# Patient Record
Sex: FEMALE | Race: WHITE
[De-identification: names, ages, dates, MRNs, and addresses within clinical notes are randomized per-mention and may not be internally consistent; named-entity substitution may affect disease eponyms.]

---

## 2021-06-03 ENCOUNTER — HOSPITAL ENCOUNTER (OUTPATIENT)
Dept: HOSPITAL 76 - DI.S | Age: 31
Discharge: HOME | End: 2021-06-03
Attending: PHYSICIAN ASSISTANT
Payer: SELF-PAY

## 2021-06-03 DIAGNOSIS — M21.612: ICD-10-CM

## 2021-06-03 DIAGNOSIS — M20.12: Primary | ICD-10-CM

## 2021-06-03 NOTE — XRAY REPORT
PROCEDURE:  Ankle 3 View LT

 

INDICATIONS:  LEFT FOOT/ANKLE PAIN

 

TECHNIQUE:  3 views of the ankle were acquired.  

 

COMPARISON:  None

 

FINDINGS:  

 

Bones:  No fractures or dislocations.  Ankle mortise is normally aligned.  No suspicious bony lesions
.  

 

Soft tissues:  No tibiotalar joint effusion.  Achilles tendon appears normal.  

 

IMPRESSION:  No evidence acute bony abnormality of the left ankle.

 

Reviewed by: Ankur Lopze MD on 6/3/2021 10:08 AM PDT

Approved by: Ankur Lopez MD on 6/3/2021 10:08 AM PDT

 

 

Station ID:  [id number]

## 2021-06-03 NOTE — XRAY REPORT
PROCEDURE:  Foot 3 View LT

 

INDICATIONS:  LEFT FOOT/ANKLE PAIN

 

TECHNIQUE:  3 views of the foot were acquired.  

 

COMPARISON:  None

 

FINDINGS:  

 

Bones:  Mild hallux valgus and bunion. No fractures or dislocations.  No suspicious bony lesions.  

 

Soft tissues:  No tibiotalar joint effusion.  Achilles tendon appears normal.  

 

IMPRESSION:  

Mild hallux valgus and bunion. No evidence of acute bony abnormality of the left foot.

 

Reviewed by: Ankur Lopez MD on 6/3/2021 10:09 AM PDT

Approved by: Ankur Lopez MD on 6/3/2021 10:09 AM PDT

 

 

Station ID:  [id number]

## 2022-05-13 ENCOUNTER — HOSPITAL ENCOUNTER (EMERGENCY)
Dept: HOSPITAL 76 - ED | Age: 32
Discharge: HOME | End: 2022-05-13
Payer: MEDICAID

## 2022-05-13 VITALS — DIASTOLIC BLOOD PRESSURE: 67 MMHG | SYSTOLIC BLOOD PRESSURE: 110 MMHG

## 2022-05-13 DIAGNOSIS — R11.2: ICD-10-CM

## 2022-05-13 DIAGNOSIS — O26.891: Primary | ICD-10-CM

## 2022-05-13 DIAGNOSIS — O99.281: ICD-10-CM

## 2022-05-13 DIAGNOSIS — E86.0: ICD-10-CM

## 2022-05-13 DIAGNOSIS — Z3A.12: ICD-10-CM

## 2022-05-13 DIAGNOSIS — R19.7: ICD-10-CM

## 2022-05-13 LAB
ALBUMIN DIAFP-MCNC: 3.9 G/DL (ref 3.2–5.5)
ALBUMIN/GLOB SERPL: 1.1 {RATIO} (ref 1–2.2)
ALP SERPL-CCNC: 40 IU/L (ref 42–121)
ALT SERPL W P-5'-P-CCNC: 27 IU/L (ref 10–60)
ANION GAP SERPL CALCULATED.4IONS-SCNC: 11 MMOL/L (ref 6–13)
AST SERPL W P-5'-P-CCNC: 33 IU/L (ref 10–42)
BASOPHILS NFR BLD AUTO: 0 10^3/UL (ref 0–0.1)
BASOPHILS NFR BLD AUTO: 0.8 %
BILIRUB BLD-MCNC: 0.2 MG/DL (ref 0.2–1)
BUN SERPL-MCNC: 6 MG/DL (ref 6–20)
CALCIUM UR-MCNC: 8.8 MG/DL (ref 8.5–10.3)
CHLORIDE SERPL-SCNC: 103 MMOL/L (ref 101–111)
CLARITY UR REFRACT.AUTO: CLEAR
CO2 SERPL-SCNC: 22 MMOL/L (ref 21–32)
CREAT SERPLBLD-SCNC: 0.6 MG/DL (ref 0.4–1)
EOSINOPHIL # BLD AUTO: 0 10^3/UL (ref 0–0.7)
EOSINOPHIL NFR BLD AUTO: 0.8 %
ERYTHROCYTE [DISTWIDTH] IN BLOOD BY AUTOMATED COUNT: 11.4 % (ref 12–15)
GFRSERPLBLD MDRD-ARVRAT: 118 ML/MIN/{1.73_M2} (ref 89–?)
GLOBULIN SER-MCNC: 3.4 G/DL (ref 2.1–4.2)
GLUCOSE SERPL-MCNC: 100 MG/DL (ref 70–100)
GLUCOSE UR QL STRIP.AUTO: NEGATIVE MG/DL
HCG UR QL: POSITIVE
HCT VFR BLD AUTO: 44.9 % (ref 37–47)
HGB UR QL STRIP: 15.3 G/DL (ref 12–16)
KETONES UR QL STRIP.AUTO: 15 MG/DL
LIPASE SERPL-CCNC: 32 U/L (ref 22–51)
LYMPHOCYTES # SPEC AUTO: 0.6 10^3/UL (ref 1.5–3.5)
LYMPHOCYTES NFR BLD AUTO: 15.5 %
MCH RBC QN AUTO: 31.9 PG (ref 27–31)
MCHC RBC AUTO-ENTMCNC: 34.1 G/DL (ref 32–36)
MCV RBC AUTO: 93.5 FL (ref 81–99)
MONOCYTES # BLD AUTO: 0.3 10^3/UL (ref 0–1)
MONOCYTES NFR BLD AUTO: 8.1 %
NEUTROPHILS # BLD AUTO: 2.9 10^3/UL (ref 1.5–6.6)
NEUTROPHILS # SNV AUTO: 3.9 X10^3/UL (ref 4.8–10.8)
NEUTROPHILS NFR BLD AUTO: 74.5 %
NITRITE UR QL STRIP.AUTO: NEGATIVE
NRBC # BLD AUTO: 0 /100WBC
NRBC # BLD AUTO: 0 X10^3/UL
PDW BLD AUTO: 10.2 FL (ref 7.9–10.8)
PH UR STRIP.AUTO: 6 PH (ref 5–7.5)
PLATELET # BLD: 277 10^3/UL (ref 130–450)
POTASSIUM SERPL-SCNC: 3.1 MMOL/L (ref 3.5–5)
PROT SPEC-MCNC: 7.3 G/DL (ref 6.7–8.2)
PROT UR STRIP.AUTO-MCNC: NEGATIVE MG/DL
RBC # UR STRIP.AUTO: NEGATIVE /UL
RBC MAR: 4.8 10^6/UL (ref 4.2–5.4)
SODIUM SERPLBLD-SCNC: 136 MMOL/L (ref 135–145)
SP GR UR STRIP.AUTO: >=1.03 (ref 1–1.03)
UROBILINOGEN UR QL STRIP.AUTO: (no result) E.U./DL
UROBILINOGEN UR STRIP.AUTO-MCNC: NEGATIVE MG/DL

## 2022-05-13 PROCEDURE — 80053 COMPREHEN METABOLIC PANEL: CPT

## 2022-05-13 PROCEDURE — 87086 URINE CULTURE/COLONY COUNT: CPT

## 2022-05-13 PROCEDURE — 81025 URINE PREGNANCY TEST: CPT

## 2022-05-13 PROCEDURE — 96361 HYDRATE IV INFUSION ADD-ON: CPT

## 2022-05-13 PROCEDURE — 96374 THER/PROPH/DIAG INJ IV PUSH: CPT

## 2022-05-13 PROCEDURE — 99283 EMERGENCY DEPT VISIT LOW MDM: CPT

## 2022-05-13 PROCEDURE — 81003 URINALYSIS AUTO W/O SCOPE: CPT

## 2022-05-13 PROCEDURE — 85025 COMPLETE CBC W/AUTO DIFF WBC: CPT

## 2022-05-13 PROCEDURE — 36415 COLL VENOUS BLD VENIPUNCTURE: CPT

## 2022-05-13 PROCEDURE — 81001 URINALYSIS AUTO W/SCOPE: CPT

## 2022-05-13 PROCEDURE — 83690 ASSAY OF LIPASE: CPT

## 2022-05-13 PROCEDURE — 99282 EMERGENCY DEPT VISIT SF MDM: CPT

## 2022-05-13 NOTE — ED PHYSICIAN DOCUMENTATION
PD HPI NVD





- Stated complaint


Stated Complaint: VOMITING,DIARRHEA +PREG





- Chief complaint


Chief Complaint: Abd Pain





- History obtained from


History obtained from: Patient





- History of Present Illness


Timing - onset: Today (Onset of last evening of nausea and vomiting with 

subsequent copious watery diarrhea.  Denies blood in her vomit nor stool.  

Feeling weak and lightheaded today.  She is 12 weeks pregnant.  Denies vaginal 

bleeding.  Mild lower abdominal cramping.), Last night


Timing - duration: Days (1)


Timing - details: Abrupt onset, Still present


Associated symptoms: Abdominal pain (mild upper abd cramping.), Loss of 

appetite.  No: Fever, Near syncope / syncope (but feeling lightheaded.)


Contributing factors: Sick contact (Her daughter had some nausea and vomiting 

mildly 4 to 5 days ago and is better after day.), Other (She did home COVID 

rapid antigen test that was negative.).  No: Bad food


Improved by: No: Vomiting


Worsened by: Eating


Similar symptoms before: Has not had sx before


Recently seen: Not recently seen





Review of Systems


Constitutional: reports: Myalgias.  denies: Fever, Chills


Nose: denies: Rhinorrhea / runny nose, Congestion


Throat: denies: Sore throat


Respiratory: denies: Cough


: denies: Dysuria, Discharge, Vaginal bleeding





PD PAST MEDICAL HISTORY





- Past Medical History


Cardiovascular: None


Respiratory: None


GYN: Other (12 weeks pregnant)





- Past Surgical History


Past Surgical History: No





- Present Medications


Home Medications: 


                                Ambulatory Orders











 Medication  Instructions  Recorded  Confirmed


 


Loperamide HCl [Imodium A-D] 2 mg PO QID PRN #12 tablet 05/13/22 


 


Ondansetron Odt [Zofran] 4 mg TL Q6H PRN #15 tablet 05/13/22 














- Allergies


Allergies/Adverse Reactions: 


                                    Allergies











Allergy/AdvReac Type Severity Reaction Status Date / Time


 


Penicillins Allergy Intermediate Edema Verified 05/13/22 14:10














- Social History


Does the pt smoke?: No


Smoking Status: Never smoker


Does the pt drink ETOH?: Yes


Does the pt have substance abuse?: No





- Immunizations


Immunizations are current?: No





- POLST


Patient has POLST: No





PD ED PE NORMAL





- Vitals


Vital signs reviewed: Yes





- General


General: Alert and oriented X 3, Well developed/nourished





- HEENT


HEENT: Pharynx benign.  No: Moist mucous membranes





- Neck


Neck: Supple, no meningeal sign, No adenopathy





- Cardiac


Cardiac: RRR, No murmur





- Respiratory


Respiratory: Clear bilaterally





- Abdomen


Abdomen: Normal bowel sounds, Soft, Non distended, No organomegaly, Other (Mild 

periumbilical to upper mid abdomen tenderness without any percussion or rebound 

tenderness.)





Results





- Vitals


Vitals: 


                               Vital Signs - 24 hr











  05/13/22 05/13/22 05/13/22





  14:10 16:12 18:00


 


Temperature 37.1 C  


 


Heart Rate 107 H 84 99


 


Respiratory 19 19 18





Rate   


 


Blood Pressure 123/67 116/76 110/67


 


O2 Saturation 97 100 100








                                     Oxygen











O2 Source                      Room air

















- Labs


Labs: 


                                Laboratory Tests











  05/13/22 05/13/22 05/13/22





  14:18 14:18 14:26


 


WBC  3.9 L  


 


RBC  4.80  


 


Hgb  15.3  


 


Hct  44.9  


 


MCV  93.5  


 


MCH  31.9 H  


 


MCHC  34.1  


 


RDW  11.4 L  


 


Plt Count  277  


 


MPV  10.2  


 


Neut # (Auto)  2.9  


 


Lymph # (Auto)  0.6 L  


 


Mono # (Auto)  0.3  


 


Eos # (Auto)  0.0  


 


Baso # (Auto)  0.0  


 


Absolute Nucleated RBC  0.00  


 


Nucleated RBC %  0.0  


 


Sodium   136 


 


Potassium   3.1 L 


 


Chloride   103 


 


Carbon Dioxide   22 


 


Anion Gap   11.0 


 


BUN   6 


 


Creatinine   0.6 


 


Estimated GFR (MDRD)   118 


 


Glucose   100 


 


Calcium   8.8 


 


Total Bilirubin   0.2 


 


AST   33 


 


ALT   27 


 


Alkaline Phosphatase   40 L 


 


Total Protein   7.3 


 


Albumin   3.9 


 


Globulin   3.4 


 


Albumin/Globulin Ratio   1.1 


 


Lipase   32 


 


Urine Color    YELLOW


 


Urine Clarity    CLEAR


 


Urine pH    6.0


 


Ur Specific Gravity    >=1.030 H


 


Urine Protein    NEGATIVE


 


Urine Glucose (UA)    NEGATIVE


 


Urine Ketones    15 H


 


Urine Occult Blood    NEGATIVE


 


Urine Nitrite    NEGATIVE


 


Urine Bilirubin    NEGATIVE


 


Urine Urobilinogen    0.2 (NORMAL)


 


Ur Leukocyte Esterase    NEGATIVE


 


Ur Microscopic Review    NOT INDICATED


 


Urine Culture Comments    NOT INDICATED


 


Urine HCG, Qual    POSITIVE














PD MEDICAL DECISION MAKING





- ED course


Complexity details: reviewed results (Bedside ultrasound showed normal pregnancy

 with fetal heart motion and fetal movement.  Amniotic fluid volume appeared 

appropriate.  Brief look at gallbladder appeared normal as well without any 

stones or wall thickening.), re-evaluated patient (feeling improved and able to 

take fluids/crackers. She does not hae nausea now. No cramps/bleeding. ), 

considered differential, d/w patient





Departure





- Departure


Disposition: 01 Home, Self Care


Clinical Impression: 


 Nausea vomiting and diarrhea, Dehydration





Pregnancy


Qualifiers:


 Weeks of gestation: 12 weeks Qualified Code(s): Z3A.12 - 12 weeks gestation of 

pregnancy





Condition: Stable


Record reviewed to determine appropriate education?: Yes


Instructions:  ED Gastroenteritis Viral


Follow-Up: 


Kristin Padilla MD [Primary Care Provider] - 


Prescriptions: 


Loperamide HCl [Imodium A-D] 2 mg PO QID PRN #12 tablet


 PRN Reason: Diarrhea


Ondansetron Odt [Zofran] 4 mg TL Q6H PRN #15 tablet


 PRN Reason: Nausea / Vomiting


Comments: 


Small frequent fluids.  [place] food initially.  Progress as tolerated.





I presume this is a viral gastroenteritis and would last just a couple of days. 

 You have received IV fluids here to try to rehydrate you.





We can continue with the ondansetron every 4-6 hours if needed for nausea.  For 

a limited time can also use Imodium 4 times a day if needed for the diarrhea.





Add Tylenol every 4-6 hours if needed for cramps or pains.





Your bedside ultrasound showed a normal-appearing fetus with good to heart 

activity and movement.  Recheck if any unusual pains vaginal bleeding or other 

concerns.





Recheck if not improved well over the next day or 2.  Return if worse.





I transmitted your prescriptions to [facility] in [place].


Discharge Date/Time: 05/13/22 18:38

## 2022-09-30 ENCOUNTER — HOSPITAL ENCOUNTER (EMERGENCY)
Dept: HOSPITAL 76 - ED | Age: 32
Discharge: HOME | End: 2022-09-30
Payer: MEDICAID

## 2022-09-30 VITALS — SYSTOLIC BLOOD PRESSURE: 109 MMHG | DIASTOLIC BLOOD PRESSURE: 62 MMHG

## 2022-09-30 DIAGNOSIS — Z3A.32: ICD-10-CM

## 2022-09-30 DIAGNOSIS — O99.513: Primary | ICD-10-CM

## 2022-09-30 DIAGNOSIS — J20.9: ICD-10-CM

## 2022-09-30 PROCEDURE — 93005 ELECTROCARDIOGRAM TRACING: CPT

## 2022-09-30 PROCEDURE — 99283 EMERGENCY DEPT VISIT LOW MDM: CPT

## 2022-09-30 NOTE — EXTERNAL MEDICAL SUMMARY RPT
Continuity of Care Document

                          Created on:2022



Patient:Moris Valentine

Sex:Female

:1990

External Reference #:[id number]





Demographics







                          Address                   [address] [place] [address]

 

                          Phone                     Unavailable

 

                          Preferred Language        English

 

                          Marital Status            

 

                          Pentecostal Affiliation     Unknown

 

                          Race                      Unknown

 

                          Ethnic Group              Unknown









Author







                          Organization              [facility]

 

                          [place] [address]

 

                          Phone                     [phone]









Allergies

No information.



Encounters

No information.



Functional Status

No information.



Immunizations

No information.



Medications

No information.



Problems

No information.



Procedures

No information.



Results/Labs







           test      date      author    facility  value     unit      interpret

ation









                                         Result panel 1









           (unknown)  (no       (unknown)  (unknown)  (no value)  (units    (unk

nown)



                    date)                                   unknown)  

 

           (unknown)  (no       (unknown)  (unknown)  [address] [place] [address]  (units  

  (unknown)



                    date)                                   unknown)  

 

           (unknown)  (no       (unknown)  (unknown)  [place]  (units    (

unknown)



                    date)                         [id number]     unknown)  

 

           (unknown)  (no       (unknown)  (unknown)  [facility]  (units   

 (unknown)



                    date)                                   unknown)  

 

           (unknown)  (no       (unknown)  (unknown)  Signed    (units    (unkno

wn)



                    date)                                   unknown)  

 

           (unknown)  (no       (unknown)  (unknown)  Ultrasound  (units    (unk

nown)



                    date)                         Report    unknown)  

 

           (unknown)  (no       (unknown)  (unknown)  (no value)  (units    (unk

nown)



                    date)                                   unknown)  

 

           (unknown)  (no       (unknown)  (unknown)  22  (units    (unkno

wn)



                    date)                                   unknown)  

 

           (unknown)  (no       (unknown)  (unknown)  7 mm in 3rd  (units    (un

known)



                    date)                         trimester. unknown)  

 

           (unknown)  (no       (unknown)  (unknown)  Abdominal  (units    (unkn

own)



                    date)                         circumference: unknown)  



                                                  15.9 cm             

 

           (unknown)  (no       (unknown)  (unknown)  Amniotic fluid  (units    

(unknown)



                    date)                         index: 14.9 cm, unknown)  



                                                  normal range is           



                                                  5-24 cm.            

 

           (unknown)  (no       (unknown)  (unknown)  Anatomic survey:  (units  

  (unknown)



                    date)                         396g      unknown)  

 

           (unknown)  (no       (unknown)  (unknown)  Approved by:  (units    (u

nknown)



                    date)                         El Mcclendon M.D. unknown)  



                                                  on 2022 at           



                                                  15:06               

 

           (unknown)  (no       (unknown)  (unknown)  Biparietal  (units    (unk

nown)



                    date)                         diameter: 4.6cm unknown)  

 

           (unknown)  (no       (unknown)  (unknown)  Bladder: Normal  (units   

 (unknown)



                    date)                         in size.  unknown)  

 

           (unknown)  (no       (unknown)  (unknown)  COMPARISON:  (units    (un

known)



                    date)                         None.     unknown)  

 

           (unknown)  (no       (unknown)  (unknown)  Cord: 3-vessel  (units    

(unknown)



                    date)                         cord has  unknown)  



                                                  orthotopic           



                                                  insertion.           

 

           (unknown)  (no       (unknown)  (unknown)  Diaphragm:  (units    (unk

nown)



                    date)                         Diaphragm is unknown)  



                                                  intact.             

 

           (unknown)  (no       (unknown)  (unknown)  Dictated by:  (units    (u

nown)



                    date)                         El Mcclendon M.D. unknown)  



                                                  on 2022 at           



                                                  14:58               

 

           (unknown)  (no       (unknown)  (unknown)  Endovaginal  (units    (un

known)



                    date)                         scanning: not unknown)  



                                                  performed           

 

           (unknown)  (no       (unknown)  (unknown)  Estimated date  (units    

(unknown)



                    date)                         of delivery ([place]) unknown)  



                                                  from first dating           



                                                  scan (this           



                                                  study): 2022           

 

           (unknown)  (no       (unknown)  (unknown)  Estimated fetal  (units   

 (unknown)



                    date)                         weight and unknown)  



                                                  percentile: 396g           

 

           (unknown)  (no       (unknown)  (unknown)  Extremities: All  (units  

  (unknown)



                    date)                         4 extremities unknown)  



                                                  identified.           

 

           (unknown)  (no       (unknown)  (unknown)  FINDINGS:  (units    (unkn

own)



                    date)                                   unknown)  

 

           (unknown)  (no       (unknown)  (unknown)  Face: Nose and  (units    

(unknown)



                    date)                         lips, facial unknown)  



                                                  profile are           



                                                  normal.             

 

           (unknown)  (no       (unknown)  (unknown)  Femur length:  (units    (

unknown)



                    date)                         3.0 cm    unknown)  

 

           (unknown)  (no       (unknown)  (unknown)  Fetal     (units    (unkno

wn)



                    date)                         biometrics: unknown)  

 

           (unknown)  (no       (unknown)  (unknown)  Fetal heart  (units    (un

known)



                    date)                         rate: 162 beats unknown)  



                                                  per minute.           

 

           (unknown)  (no       (unknown)  (unknown)  General: A  (units    (unk

nown)



                    date)                         single living unknown)  



                                                  intrauterine           



                                                  gestation is           



                                                  present.            

 

           (unknown)  (no       (unknown)  (unknown)  Head      (units    (unkno

wn)



                    date)                         circumference: unknown)  



                                                  13.1 cm             

 

           (unknown)  (no       (unknown)  (unknown)  Heart:    (units    (unkno

wn)



                    date)                         4-chambered heart unknown)  



                                                  is present, with           



                                                  normal              



                                                  ventricular           



                                                  outflow tracts.           

 

           (unknown)  (no       (unknown)  (unknown)  IMPRESSION:  (units    (un

known)



                    date)                         Intrauterine unknown)  



                                                  living gestation           



                                                  identified at 20           



                                                  weeks and 5 days           

 

           (unknown)  (no       (unknown)  (unknown)  INDICATIONS:  (units    (u

nknown)



                    date)                         ANATOMY SCAN unknown)  

 

           (unknown)  (no       (unknown)  (unknown)  Kidneys: No  (units    (un

known)



                    date)                         fetal     unknown)  



                                                  hydronephrosis.           



                                                  Normal is less           



                                                  than 5 mm in 2nd           



                                                  trimester,           

 

           (unknown)  (no       (unknown)  (unknown)  LMP unknown  (units    (un

known)



                    date)                                   unknown)  

 

           (unknown)  (no       (unknown)  (unknown)  Maternal  (units    (unkno

wn)



                    date)                         cervical canal: unknown)  



                                                  4.9cm long.           



                                                  Normal lower           



                                                  limit is 2.5 cm.           

 

           (unknown)  (no       (unknown)  (unknown)  Neuro:    (units    (unkno

wn)



                    date)                         Ventricles are unknown)  



                                                  non-dilated at           



                                                  less than 10 mm.           



                                                  Cisterna magna is           

 

           (unknown)  (no       (unknown)  (unknown)  Nuchal skin  (units    (un

known)



                    date)                         fold:  Normal at unknown)  



                                                  less than 6 mm           



                                                  between 14-21           



                                                  weeks gestational           

 

           (unknown)  (no       (unknown)  (unknown)  OUTSIDE/PRIOR  (units    (

unknown)



                    date)                         DATING DATA: unknown)  

 

           (unknown)  (no       (unknown)  (unknown)  Placenta:  (units    (unkn

own)



                    date)                         Placental unknown)  



                                                  position is           



                                                  posterior ,           



                                                  without previa           

 

           (unknown)  (no       (unknown)  (unknown)  Presentation:  (units    (

unknown)



                    date)                         vertex    unknown)  

 

           (unknown)  (no       (unknown)  (unknown)  Real-time  (units    (unkn

own)



                    date)                         scanning was unknown)  



                                                  performed of the           



                                                  fetus, with image           



                                                  documentation and           

 

           (unknown)  (no       (unknown)  (unknown)  Spine: No  (units    (unkn

own)



                    date)                         evidence for unknown)  



                                                  spina bifida.           

 

           (unknown)  (no       (unknown)  (unknown)  Stomach:  (units    (unkno

wn)



                    date)                         Left-sided unknown)  



                                                  stomach is           



                                                  present.            

 

           (unknown)  (no       (unknown)  (unknown)  TECHNIQUE:  (units    (unk

nown)



                    date)                                   unknown)  

 

           (unknown)  (no       (unknown)  (unknown)  estimated  (units    (unkn

own)



                    date)                         gestational age: unknown)  



                                                  20 weeks and 5           



                                                  days.               

 

           (unknown)  (no       (unknown)  (unknown)  measurements.  (units    (

unknown)



                    date)                                   unknown)  

 

           (unknown)  (no       (unknown)  (unknown)  mm. Cerebellum  (units    

(unknown)



                    date)                         is normal in size unknown)  



                                                  and morphology.           

 

           (unknown)  (no       (unknown)  (unknown)  this ultrasound  (units   

 (unknown)



                    date)                         biometry. Routine unknown)  



                                                  fetal anatomic           



                                                  survey is normal.           

 

           (unknown)  (no       (unknown)  (unknown)  [id number]    (units    (unkno

wn)



                    date)                                   unknown)  

 

           (unknown)  (no       (unknown)  (unknown)  Accession  (units    (unkn

own)



                    date)                         Number:   unknown)  



                                                  [id number]           

 

           (unknown)  (no       (unknown)  (unknown)  Age/Sex: 32 / F  (units   

 (unknown)



                    date)                         Date of Service: unknown)  

 

           (unknown)  (no       (unknown)  (unknown)  : 1990  (units   

 (unknown)



                    date)                         Acct:[id number] unknown)  

 

           (unknown)  (no       (unknown)  (unknown)  Loc: [place]   (units    (unkno

wn)



                    date)                                   unknown)  

 

           (unknown)  (no       (unknown)  (unknown)  Ordering  (units    (unkno

wn)



                    date)                         Provider: unknown)  



                                                  Kanwal Shah           

 

           (unknown)  (no       (unknown)  (unknown)  PROCEDURE: US OB  (units  

  (unknown)



                    date)                         >= 14 WEEKS FETUS unknown)  

 

           (unknown)  (no       (unknown)  (unknown)  Patient:  (units    (unkno

wn)



                    date)                         Moris Valentine unknown)  



                                                  MR#: [id number]           

 

           (unknown)  (no       (unknown)  (unknown)  Procedure: US OB  (units  

  (unknown)



                    date)                         >= 14 weeks Fetus unknown)  

 

           (unknown)  (no       (unknown)  (unknown)  age.      (units    (unkno

wn)



                    date)                                   unknown)  

 

           (unknown)  (no       (unknown)  (unknown)  based on  (units    (unkno

wn)



                    date)                                   unknown)  

 

           (unknown)  (no       (unknown)  (unknown)  biometric  (units    (unkn

own)



                    date)                                   unknown)  

 

           (unknown)  (no       (unknown)  (unknown)  less than  (units    (unkn

own)



                    date)                                   unknown)  

 

           (unknown)  (no       (unknown)  (unknown)  normal at 3-11  (units    

(unknown)



                    date)                                   unknown)  







Social History

No information.



Vital Signs

No information.

## 2022-09-30 NOTE — ED PHYSICIAN DOCUMENTATION
PD HPI DYSPNEA





- Stated complaint


Stated Complaint: SOA/32WKS





- Chief complaint


Chief Complaint: Resp





- History obtained from


History obtained from: Patient





- History of Present Illness


Timing - onset: How many days ago (3)


Timing - details: Gradual onset


Pain level now: 0


Associated symptoms: No: Fever (Tmax 99.8)


Recently seen: Not recently seen





- Additional information


Additional information: 





chief complaint of dyspnea. She says she has a "head cold" (per patient) with 

sore throat, generalized myalgias and malaise. Sinus congestion although this 

has been improving. Symptom onset 3 days ago. Patient is 32 weeks pregnant, 

. Tmax 99.8. she has taken multiple COVID tests at home , results have been 

negative. She has has seasonal allergies for which she uses albuterol MDI, but 

only occasionally for a few weeks /months of the year. 





Review of Systems


Constitutional: denies: Fever (Tmax 99.8), Chills, Sweats


Throat: reports: Sore throat


Cardiac: denies: Chest pain / pressure


Respiratory: reports: Dyspnea, Cough, Wheezing.  denies: Hemoptysis


GI: reports: Reviewed and negative


: reports: Now pregnant EGABDIAS (32 weeks).  denies: Dysuria, Frequency





PD PAST MEDICAL HISTORY





- Past Medical History


Past Medical History: Yes


Cardiovascular: None


Respiratory: Asthma, Pneumonia, Other


GYN: Other


Other Past Medical History: bronchitis





- Past Surgical History


Past Surgical History: No





- Present Medications


Home Medications: 


                                Ambulatory Orders











 Medication  Instructions  Recorded  Confirmed


 


Loperamide HCl [Imodium A-D] 2 mg PO QID PRN #12 tablet 22 


 


Ondansetron Odt [Zofran] 4 mg TL Q6H PRN #15 tablet 22 


 


Albuterol Sulf [Ventolin Hfa 1 - 2 puffs INH Q4HR PRN #1 each 22 





Inhaler]   


 


Azithromycin See Taper PO DAILY #6 tablet 22 


 


predniSONE [Deltasone] 40 mg PO DAILY 4 Days #8 tablet 22 














- Allergies


Allergies/Adverse Reactions: 


                                    Allergies











Allergy/AdvReac Type Severity Reaction Status Date / Time


 


Penicillins Allergy Intermediate Edema Verified 22 18:31














- Social History


Does the pt smoke?: No


Smoking Status: Never smoker


Does the pt drink ETOH?: Yes


Does the pt have substance abuse?: No





- Immunizations


Immunizations are current?: No





- POLST


Patient has POLST: No





PD ED PE NORMAL





- Vitals


Vital signs reviewed: Yes





- General


General: Alert and oriented X 3, No acute distress, Well developed/nourished





- HEENT


HEENT: Pharynx benign





- Neck


Neck: Supple, no meningeal sign





- Cardiac


Cardiac: RRR, No murmur





- Respiratory


Respiratory: No respiratory distress





- Abdomen


Abdomen: Soft, Non tender





PD ED PE EXPANDED





- Respiratory


Respiratory: Wheezing (expiratory wheezing all lung fields)





Results





- Vitals


Vitals: 


                                     Oxygen











O2 Source                      Room air

















- EKG (time done)


  ** No standard instances


Rate: Rate (enter#) (99)


Rhythm: NSR


Axis: Normal


Intervals: Normal WV


QRS: Normal


Ischemia: Normal ST segments, T wave inversion (II, III, aVF), Other (flat T V3-

V6)





PD MEDICAL DECISION MAKING





- ED course


Complexity details: re-evaluated patient, considered differential, d/w patient


ED course: 





given duoneb and reports feeling much improved with this. On reexam she has 

residual end-expiratory wheezing. Prescriptions submitted to her pharmacy of 

choice for albuterol MDI as well as prednisone and zithromax. We discussed the 

latter two medications, including option for starting these in ED but patient 

prefers to use the prednisone only if she is not responding adequately to the 

albuterol MDI. She is encouraged to return if worse, but should she develop 

increasing productive cough or develop fever 100.4 or higher, she has the option

 of starting the azithromycin





Departure





- Departure


Disposition: 01 Home, Self Care


Clinical Impression: 


 Bronchitis with bronchospasm





Condition: Good


Instructions:  ED Bronchitis Asthmatic


Follow-Up: 


Kristin Padilla MD [Primary Care Provider] - 


Prescriptions: 


Albuterol Sulf [Ventolin Hfa Inhaler] 1 - 2 puffs INH Q4HR PRN #1 each


 PRN Reason: Shortness Of Air/Wheezing


Azithromycin See Taper PO DAILY #6 tablet


predniSONE [Deltasone] 40 mg PO DAILY 4 Days #8 tablet


Comments: 


Prescriptions have been electronically submitted to [facility] pharmacy in [place]. 


Albuterol inhaler to be used every four hours as needed for difficulty 

breathing.


I also prescribed an antibiotic. As we discussed, you can start the antibiotic 

if your symptoms worsen and/or if you develop a fever of 100.4 or higher. 


I also prescribed a steroid (prednisone), which you can start and take as 

prescribed if your symptoms worsen and not responding to the inhaler alone. 


Discharge Date/Time: 22 22:49

## 2022-12-01 ENCOUNTER — HOSPITAL ENCOUNTER (INPATIENT)
Dept: HOSPITAL 76 - WFO | Age: 32
LOS: 1 days | Discharge: HOME | End: 2022-12-02
Attending: ADVANCED PRACTICE MIDWIFE | Admitting: ADVANCED PRACTICE MIDWIFE
Payer: MEDICAID

## 2022-12-01 DIAGNOSIS — Z3A.39: ICD-10-CM

## 2022-12-01 LAB
BASOPHILS NFR BLD AUTO: 0 10^3/UL (ref 0–0.1)
BASOPHILS NFR BLD AUTO: 0.2 %
EOSINOPHIL # BLD AUTO: 0.2 10^3/UL (ref 0–0.7)
EOSINOPHIL NFR BLD AUTO: 1.5 %
ERYTHROCYTE [DISTWIDTH] IN BLOOD BY AUTOMATED COUNT: 12.5 % (ref 12–15)
HCT VFR BLD AUTO: 34.7 % (ref 37–47)
HGB UR QL STRIP: 11.4 G/DL (ref 12–16)
LYMPHOCYTES # SPEC AUTO: 1.8 10^3/UL (ref 1.5–3.5)
LYMPHOCYTES NFR BLD AUTO: 17.4 %
MCH RBC QN AUTO: 29.3 PG (ref 27–31)
MCHC RBC AUTO-ENTMCNC: 32.9 G/DL (ref 32–36)
MCV RBC AUTO: 89.2 FL (ref 81–99)
MONOCYTES # BLD AUTO: 0.5 10^3/UL (ref 0–1)
MONOCYTES NFR BLD AUTO: 5.1 %
NEUTROPHILS # BLD AUTO: 7.6 10^3/UL (ref 1.5–6.6)
NEUTROPHILS # SNV AUTO: 10.1 X10^3/UL (ref 4.8–10.8)
NEUTROPHILS NFR BLD AUTO: 75.2 %
NRBC # BLD AUTO: 0 /100WBC
NRBC # BLD AUTO: 0 X10^3/UL
PDW BLD AUTO: 12.5 FL (ref 7.9–10.8)
PLATELET # BLD: 247 10^3/UL (ref 130–450)
RBC MAR: 3.89 10^6/UL (ref 4.2–5.4)

## 2022-12-01 PROCEDURE — 86901 BLOOD TYPING SEROLOGIC RH(D): CPT

## 2022-12-01 PROCEDURE — 59025 FETAL NON-STRESS TEST: CPT

## 2022-12-01 PROCEDURE — 86900 BLOOD TYPING SEROLOGIC ABO: CPT

## 2022-12-01 PROCEDURE — 99215 OFFICE O/P EST HI 40 MIN: CPT

## 2022-12-01 PROCEDURE — 85025 COMPLETE CBC W/AUTO DIFF WBC: CPT

## 2022-12-01 PROCEDURE — 10907ZC DRAINAGE OF AMNIOTIC FLUID, THERAPEUTIC FROM PRODUCTS OF CONCEPTION, VIA NATURAL OR ARTIFICIAL OPENING: ICD-10-PCS | Performed by: ADVANCED PRACTICE MIDWIFE

## 2022-12-01 PROCEDURE — 86850 RBC ANTIBODY SCREEN: CPT

## 2022-12-01 RX ADMIN — ACETAMINOPHEN SCH MG: 500 TABLET ORAL at 14:31

## 2022-12-01 RX ADMIN — ACETAMINOPHEN SCH MG: 500 TABLET ORAL at 22:50

## 2022-12-01 RX ADMIN — IBUPROFEN SCH MG: 800 TABLET, FILM COATED ORAL at 14:31

## 2022-12-01 RX ADMIN — IBUPROFEN SCH MG: 800 TABLET, FILM COATED ORAL at 22:50

## 2022-12-01 RX ADMIN — DOCUSATE SODIUM SCH MG: 100 CAPSULE, LIQUID FILLED ORAL at 22:50

## 2022-12-01 NOTE — PROCEDURE REPORT
- HPI


                                                               Current Pregnancy





EDU                              22


Gestation                        39 Weeks and 6 Days


                          4


Para                             1





Vital Signs











Temperature  36.7 C   22 07:14


 


Heart Rate  78   22 07:14


 


Respiratory Rate  17   22 07:14


 


Blood Pressure  125/79   22 07:14


 


O2 Saturation  100   22 07:14




















Temperature  36.7 C   22 07:53


 


Heart Rate  78   22 07:14


 


Respiratory Rate  17   22 07:14


 


Blood Pressure  125/79   22 07:14


 


O2 Saturation  100   22 07:14


 


If not protocol: Oxygen Flow, liters/minute      














- NST Procedure


NST Procedure





Start Date                       22


Start Time                       07:10


Stop Time                        07:30


Vibroacoustic Stimulation Used   No


Patient States Fetal Movement    Yes











- Results and Plan


Plan: 





NST reactive. FHR baseline 150s, moderate variability, + accels, no decels


Contractions palpate moderate inconsistently with soft resting tone.

## 2022-12-01 NOTE — ANESTHESIA
Pre-Anesthesia VS, & Labs





- Diagnosis





request for labor analgesia





- Procedure





labor epidural


Vital Signs: 





                                        











Temp Pulse Resp BP Pulse Ox O2 Flow Rate


 


 36.7 C   78   17   125/79   100    


 


 22 07:53  22 07:14  22 07:14  22 07:14  22 07:14 

 











Height: 5 ft 5 in


Weight (kg): 75.57 kg


Body Mass Index: 27.7


BMI Classification: Overweight





- NPO


Other





- Pregnancy


Is Patient Pregnant?: Yes





- Lab Results


Current Lab Results: 





Laboratory Tests





22 08:06: Blood Type A POSITIVE


22 07:45: Blood Type Recheck A POSITIVE


22 07:45: WBC 10.1, RBC 3.89 L, Hgb 11.4 L, Hct 34.7 L, MCV 89.2, MCH 

29.3, MCHC 32.9, RDW 12.5, Plt Count 247, MPV 12.5 H, Neut # (Auto) 7.6 H, Lymph

# (Auto) 1.8, Mono # (Auto) 0.5, Eos # (Auto) 0.2, Baso # (Auto) 0.0, Absolute 

Nucleated RBC 0.00, Nucleated RBC % 0.0


22 07:45: Blood Type Cancelled, Antibody Screen Cancelled








Fish Bones: 


                                 22 07:45








Home Medications and Allergies





Active Medications





Carboprost Tromethamine (Carboprost Tromethamine 250 Mcg/Ml Amp)  250 mcg IM 

.ONCE PRN


   PRN Reason: Hemorrhage


Fentanyl (Fentanyl 100 Mcg/2 Ml Vial)  50 mcg IVP Q1H PRN


   PRN Reason: Severe Pain (score 7-10)


Hydralazine HCl (Hydralazine Inj 20 Mg/Ml Vial)  5 - 10 mg IVP Q20M PRN; 

Protocol


   PRN Reason: SBP> or= 160 OR DBP> or= 110


Hydralazine HCl (Hydralazine Inj 20 Mg/Ml Vial)  10 mg IVP .ONCE PRN; Protocol


   PRN Reason: SBP> or= 160 OR DBP> or= 110


Oxytocin/Sodium Chloride (Pitocin/Sodium Chloride)  500 mls @ 999 mls/hr IV PRN 

PRN; Protocol


   PRN Reason: POST-PARTUM HEMORR PREVENTION


Tranexamic Acid (Tranexamic 1,000 Mg/100ml-Nacl)  1,000 mg in 100 mls @ 600 

mls/hr IV Q30M PRN


   PRN Reason: EBL >1200mL and within 3hr


Lactated Ringer's (Lr)  1,000 mls @ 125 mls/hr IV .Q8H [facility]


   Last Admin: 22 07:48 Dose:  125 mls/hr


   


Labetalol HCl (Labetalol 20 Mg/4 Ml Syringe)  20 - 80 mg IVP Q10M PRN; Protocol


   PRN Reason: SBP> or= 160 OR DBP> or= 110


Labetalol HCl (Labetalol 20 Mg/4 Ml Syringe)  20 mg IVP .ONCE PRN; Protocol


   PRN Reason: SBP> or= 160 OR DBP> or= 110


Labetalol HCl (Labetalol 20 Mg/4 Ml Syringe)  20 - 40 mg IVP Q10M PRN; Protocol


   PRN Reason: SBP> or= 160 OR DBP> or= 110


Lidocaine HCl (Lidocaine 1% 20 Ml Mdv)  20 ml ID .ONCE PRN


   PRN Reason: PERINEAL REPAIR


   Stop: 22 07:33


Methylergonovine Maleate (Methylergonovine 0.2 Mg/Ml Vial)  0.2 mg IM .ONCE PRN


   PRN Reason: Hemorrhage


Misoprostol (Misoprostol 200 Mcg Tablet)  600 mcg BC .ONCE PRN


   PRN Reason: Hemorrhage


Misoprostol (Misoprostol 200 Mcg Tablet)  800 mcg RI .ONCE PRN


   PRN Reason: Hemorrhage


Nifedipine (Nifedipine 10 Mg Capsule)  10 - 20 mg PO Q20M PRN; Protocol


   PRN Reason: SBP> or= 160 OR DBP> or= 110


Oxytocin (Oxytocin 10 Unit/Ml Vial)  10 unit IM .ONCE PRN


   PRN Reason: Step One if no IV access.


Sodium Chloride (Sodium Chloride Flush 0.9% 10 Ml Syringe)  10 ml IVP PRN PRN


   PRN Reason: AS NEEDED PER PROVIDER ORDERS


Sodium Chloride (Sodium Chloride Flush 0.9% 10 Ml Syringe)  10 ml IVP Q8H Critical [facility] hospital


   Last Admin: 22 07:48 Dose:  10 ml


   


Terbutaline Sulfate (Terbutaline 1 Mg/Ml Vial)  0.25 mg SUBQ .ONCE PRN


   PRN Reason: Tachystole








Allergies/Adverse Reactions: 


                                    Allergies











Allergy/AdvReac Type Severity Reaction Status Date / Time


 


Penicillins Allergy Intermediate Edema Verified 22 18:31














Anes History & Medical History





- Anesthetic History


Anesthesia Complications: reports: No previous complications


Family history of Anesthesia Complications: Denies


Family history of Malignant Hyperthermia: Denies





- Medical History


Cardiovascular: reports: None


Pulmonary: reports: Asthma, Pneumonia, Other


Smoking Status: Never smoker





Exam


General: Alert, Oriented x3, Cooperative, No acute distress


Dental: WNL


Mouth Openin Fingerbreadth


Neck Mobility: Normal


Mallampati classification: II


Thyromental Distance: 4-6 cm


Cardiovascular: Regular rate


Mental/Cognitive Status: Alert/Oriented X3, Normal for patient


Cognitive Status: Within normal limits





Plan


Anesthesia Type: Epidural


Consent for Procedure(s) Verified and Reviewed: Yes


Code Status: Attempt Resuscitation


ASA classification: 2-Mild systemic disease


Is this case an emergency?: No

## 2022-12-01 NOTE — DELIVERY NOTE
Delivery Note





- Labor


Labor: positive: Spontaneous, Augmented by ARM





- Infant Delivery Method


Infant Delivery Method: positive: Spontaneous vaginal delivery





- Birth Presentation


Birth Presentation: positive: GUERDA - left occiput anterior





- Nuchal Cord


Nuchal Cord: positive: Present





- Amniotic Fluid Description


Amniotic Fluid Description: positive: Clear





- Episiotomy Type


Episiotomy Type: positive: None





- Laceration


Laceration: positive: None





- Delivery Outcome


Delivery Outcome: positive: Livebirth





- [place]


: positive: Placed in direct skin contact with mother, Stimulated, 

Warmed, [place] used


[place] sex: positive: Female





- Cord


Cord: positive: 3 vessels





- Placenta


Placenta: positive: Intact, Spontaneous





- Estimated Blood Loss


Estimated Blood Loss (in cc): 250





- Post Delivery Events


Post Delivery Events: positive: No post delivery events





- Delivery Comments (Free Text/Narrative)


Delivery Comments (Free Text/Narrative): 





Labor: This 33yo  @ 39.6wks gestation by 9.2wk U/S presented on 

2022 with c/o contractions, leakage of fluid, and blood tinged vaginal 

discharge. Cervical exam was difficult secondary to pt discomfort and she was 

admitted to [facility] requesting epidural for pain management. At that time cervix 

was 6/80/-1 and vertex. AROM occurred and was noted to be a small amount of 

clear fluid. Normal labor course. FHR demonstrated Category I pattern throughout

labor. Pt progressed to c/c/+1 @ 1252. 


Birth: Normal SVB of viable female infant on 2022 @ 1333. Nuchal x 1 

delivered through. The  was placed on maternal abdomen, stimulated, 

dried, and placed skin to skin. Apgar's were 8/9 at 1 and 5 minutes 

respectively. Pitocin administered via IV for hemostasis. The umbilical cord was

allowed to stop pulsating at which time it was doubly clamped by CNM and cut by 

FOB. Cord blood was obtained. 3VC. Fundal massage and gentle cord traction 

applied for active management of the third stage of labor. Placenta delivered 

spontaneously and intact at 1339. EBL 250mL.


Fourth stage: Uterine fundus firm and there is no excessive bleeding. The 

perineum, vagina, and cervix were inspected and noted to be intact. 

Breastfeeding initiated. Family bonding well. Both mother and baby were left in 

stable condition.

## 2022-12-01 NOTE — HISTORY & PHYSICAL EXAMINATION
Prenatal Admit History





- Visit Reason


Visit Reason: Contractions, Bloody show





- Pregnancy


: 4


Parity: 1


Premature: 0


Ectopic: 0


: 2


Prenatal Care: positive: [facility]


Prenatal Risk/History: positive: None


Pregnancy Complications This Pregnancy: positive: None


Smoking Status: Former smoker





- Mother's Labs


Mother's Blood Type: positive: A


Mother's RH: positive: Positive


GBS: positive: Group B Step Negative


Rubella Status: positive: Immune





Meds/Allgy





- Home Medications


Home Medications: 


                                Ambulatory Orders











 Medication  Instructions  Recorded  Confirmed


 


Loperamide HCl [Imodium A-D] 2 mg PO QID PRN #12 tablet 22 


 


Ondansetron Odt [Zofran] 4 mg TL Q6H PRN #15 tablet 22 


 


Albuterol Sulf [Ventolin Hfa 1 - 2 puffs INH Q4HR PRN #1 each 22 





Inhaler]   


 


Azithromycin See Taper PO DAILY #6 tablet 22 


 


predniSONE [Deltasone] 40 mg PO DAILY 4 Days #8 tablet 22 














- Allergies


Allergies/Adverse Reactions: 


                                    Allergies











Allergy/AdvReac Type Severity Reaction Status Date / Time


 


Penicillins Allergy Intermediate Edema Verified 22 18:31














Review of Systems





- Constitutional


Constitutional: denies: Fatigue, Fever, Chills, Malaise





- Eyes


Eyes: denies: Blurred vision, Spots in vision, Dipolpia





- Cardiovascular


Cariovascular: denies: Irregular heart rate, Palpitations, Chest pain, Edema





- Respiratory


Respiratory: denies: Cough, Wheezing, SOB at rest





- Gastrointestinal


Gastrointestinal: denies: Constipation, Diarrhea, Nausea, Vomiting





- Genitourinary


Genitourinary: denies: Dysuria





- Integumentary


Integumentary: denies: Rash, Pruritis





- Neurological


Neurological: denies: Headache





Physical





- Abdominal Exam


Vital Signs: 





                                        











Temp Pulse Resp BP Pulse Ox O2 Flow Rate


 


 36.7 C   78   17   125/79   100    


 


 22 07:53  22 07:14  22 07:14  22 07:14  22 07:14 

  











Contraction Frequency (min/apart): 10


Contraction Intensity: positive: Mild to moderate


Uterine Resting Tone: positive: Soft





- Fetal Monitoring


Fetal Heart Rate Baseline: 150


Fetal Strip Review: positive: Category I





- Presentation


Presentation: positive: Vertex





- Vaginal Exam


Membranes: positive: Membranes intact


Dilation (in cm): 6


Effacement (%): 90


Station: positive: -1


Cervical Position: positive: Posterior





- Speculum Exam


Speculum Exam Performed: positive: No





Plan for Labor





- Plan For Labor


I expect patient to be DC'd or transferred within 96 hours.: Yes


Plan for Labor: 





HPI: This 33yo  @ 39.6wks gestation by 9.5wk U/S presents to [facility] with 

complaints of contractions which have been intermittent in frequency however 

have progressively increased in intensity and duration throughout the night. She

 reports bloody show early this morning and feels she has been experiencing 

leaking for the past several hours but is unsure. Upon arrival her cervical exam

 was difficult secondary to discomfort. Her last SVE 48 hours ago was 3-4/80/-2 

in the office. She is requesting an epidural for pain management and will be 

admitted to [facility].


She has been a patient of [facility] for the duration of her 

pregnancy which has remained uncomplicated. Of note she did not complete her 

glucola at 28 weeks. She has otherwise received consistent and adequate prenatal

 care for the duration of her pregnancy.





Dating Criteria:


LMP unknown


Initial U/S @ 9.5wks dates pregnancy


Serial exams - agree





Medical Hx: no significant


Surgical Hx: none


Family Hx: HTN, thyroid disease, cancer, stroke


Meds: PNV, Unisom PRN


Allergies: Penicillin


Social: Not , lives with partner.  Works as a lead  and manager at 

[employer].  No tobacco, ETOH or recreational drug use.  Caffeine intake

 - moderate.  





Prenatal course:


A positive, antibody negative


Rubella immune; varicella immune


Initial U/S @ 9.5wks dates pregnancy


Genetic screening - declined


FAS WNL. Anterior placenta, no previa. Size c/w dating. 3VC.


Glucola - declined


Tdap - declined (desires postpartum)


COVID-19 vaccine - declined


Influenza vaccine - declined


GBS negative





Physical Exam:


Normocephalic, atraumatic


Heart RRR w/o M/G/R


Lungs CTAB


Abdomen gravid, soft, nontender


EFW 3400g


FHR baseline 150s, moderate variability, + accels, no decels


Contractions palpate mild to moderate inconsistently with soft resting tone


SVE 6/90/-1, posterior, soft. Vertex


AROM noted to be a small amount of clear fluid


Bilateral LEs no edema.


Mood is good.





Assessment:


33yo  @ 39.6wks gestation by 9.5wk U/S


Active labor


GBS neg


FHR Category I





Plan:


Epidural per maternal request.


Continuous fetal monitoring.


Initiate pitocin with titration per protocol if contractions do not develop a 

regular pattern over the next 30 minutes.


Anticipate .

## 2022-12-02 VITALS — SYSTOLIC BLOOD PRESSURE: 104 MMHG | DIASTOLIC BLOOD PRESSURE: 54 MMHG

## 2022-12-02 RX ADMIN — ACETAMINOPHEN SCH MG: 500 TABLET ORAL at 08:34

## 2022-12-02 RX ADMIN — IBUPROFEN SCH MG: 800 TABLET, FILM COATED ORAL at 08:34

## 2022-12-02 RX ADMIN — IBUPROFEN SCH MG: 800 TABLET, FILM COATED ORAL at 14:03

## 2022-12-02 RX ADMIN — DOCUSATE SODIUM SCH MG: 100 CAPSULE, LIQUID FILLED ORAL at 08:34

## 2022-12-02 NOTE — LABOR FLOWSHEET
===================================

Labor Flowsheet

===================================

Datetime Report Generated by CPN: 2022 16:31

   

   

===========================

Datetime: 2022 08:13

===========================

   

   

===================================

VITAL SIGNS

===================================

   

 NBP Sys/Katya/Mean (mmHg):  104

:  54

:  65

 Pulse:  83

   

===========================

Datetime: 2022 15:15

===========================

   

Stage of Pregnancy:  Recovery

 Respirations:  16

 Temperature (C):  36.9

 Temperature Route:  Oral

   

===========================

Datetime: 2022 14:04

===========================

   

 SpO2 (%):  100

   

===================================

PAIN

===================================

   

 Pain Scale:  3

 Pain Presence:  Intermittent

 Pain Location:  Back

   

===========================

Datetime: 2022 13:30

===========================

   

   

===================================

UTERINE ACTIVITY

===================================

   

 Monitor Mode:  External

 Frequency (min):  1-2

 Quality:  Strong

 Duration (sec):  60-90

 Pattern:  Normal: <= 5 Contractions in 10 Minutes

 Resting Tone (Palpate):  Relaxed

 Contraction Comments:  pushing with ctx

   

===================================

FETAL ASSESSMENT A

===================================

   

 Monitor Mode:  Telemetry

 FHR Baseline Rate :  145

 Variability:  Minimal - Undetectable to <=5 bpm

 Accelerations:  None

 Decelerations:  Variable

 Category:  Category II

 Oxygen Method:  Room Air

 Pushing Progress:   with Pushing

 LaborFlag:  Labor

   

===========================

Datetime: 2022 13:15

===========================

   

 Comments:  variables with pushing. returns to baseline

   

===========================

Datetime: 2022 12:57

===========================

   

   

===================================

STAGE 2

===================================

   

 Pushing:  Urge to Push

 Pushing Position:  Pushing with Contractions

   

===========================

Datetime: 2022 12:52

===========================

   

   

===================================

VAGINAL EXAM

===================================

   

 Dilatation (cm):  10.0

 Station:  1

 Exam by:  heri ronni cnm

   

===========================

Datetime: 2022 12:45

===========================

   

 FHR Baseline Changes:  No Baseline Change

   

===========================

Datetime: 2022 12:15

===========================

   

 Monitor Interventions for FHR:  Ultrasound Adjusted

   

===========================

Datetime: 2022 12:02

===========================

   

 Pain Coping:  Talking Through Contractions

   

===========================

Datetime: 2022 11:13

===========================

   

 Effacement (%):  90

 Cervix, Consistency:  Soft

   

===========================

Datetime: 2022 11:10

===========================

   

 Patient Position/Activity:  Left Lateral

 Patient Care Comments:  peanut ball

   

===========================

Datetime: 2022 11:02

===========================

   

 Anesthesia Comments:  T10 on right side. still feeling more pain on left. CRNA meaghan at bedside. iris
us given

   

===========================

Datetime: 2022 11:00

===========================

   

 Provider Reviewed Strip:  Yes

   

===================================

COMMUNICATION

===================================

   

 Communication:  Provider at Bedside

 Communication Comments:  Heri Ronni CNM

   

===========================

Datetime: 2022 10:55

===========================

   

 Pain Assessment Comments:  CRNA at bedside. pain increasing on left side. 

   

===========================

Datetime: 2022 10:37

===========================

   

 Vaginal Bleeding:  None

 Cervix, Position:  Midposition

 I/O Interventions:  Flores Cath Inserted

   

===========================

Datetime: 2022 10:30

===========================

   

 Monitor Interventions for UA:  [place] Adjusted

   

===========================

Datetime: 2022 09:31

===========================

   

 Membrane Status:  Ruptured

 Membranes Rupture Method:  Artificial

 Amniotic Fluid Color:  Clear

 Amniotic Fluid Amount:  Small

   

===========================

Datetime: 2022 09:20

===========================

   

   

===================================

PATIENT CARE

===================================

   

 IV/Blood Work:  IV Bolus Started

 Epidural Procedure Other:  Pump Started

   

===========================

Datetime: 2022 09:05

===========================

   

 Epidural Procedure:  Test Dose

   

===========================

Datetime: 2022 08:51

===========================

   

   

===================================

PROCEDURE TIME OUT

===================================

   

 Procedure Verify:  Correct Patient Identity; Correct Side and Site are Marked; Accurate Procedure Co
nsent Form; Agreement on Procedure to be Done; Correct Patient Position; Addressed Need to Administer
 Antibiotics or Fluids for Irrigation; Safety Precautions Based on Patient History or Medication Use

   

===================================

ANESTHESIA

===================================

   

 Anesthesia Plans:  Epidural

 Epidural Positioning:  Sitting

## 2022-12-02 NOTE — DISCHARGE SUMMARY
Discharge Summary





- HOSPITAL COURSE


Hospital Course: 





Date of Admission: 2022





Date of Discharge: 2022





Diagnosis on Admission:


1. 33yo  @ 39.6wks gestation by 7wk U/S


2. Active labor


3. GBS neg


4. FHR Category I





Diagnosis on Discharge:


1. 33yo  PPD#1 s/p TSVD viable female infant


2. Breastfeeding


3. Normal recovery





Brief History: She is a patient of [facility] who presented on 

2022 with c/o contractions and bloody discharge in addition to possible 

small leakage of clear fluid. Cervix was 6/80/-1 and vertex and she was found to

contract inconsistently. Epidural was placed per maternal request. She 

progressed spontaneously to deliver a viable female infant on 2022 at 1333

over intact perineum. Apgars were 8/9 at 1 and 5 minutes respectively. EBL 

250mL.


She has been doing well in her postpartum course. She is ambulating and 

tolerating and regular diet. She is urinating without difficulty and her lochia 

is normal. Her pain is well controlled with oral medications. She had a normal 

bowel movement early this morning without pain. She is breastfeeding without 

difficulty and bonding well with her baby. She will be discharged home today on 

postpartum day #1 with instructions to continue taking her prenatal vitamin 

while breastfeeding and to continue taking ibuprofen and tylenol over the 

counter as needed for pain management. She intends to follow up with myself at 

[facility] in 1 week for routine postpartum visit or sooner if 

needed. She has been given precautions to call if she has any worsening fevers, 

chills, abdominal pain, increased vaginal bleeding or foul smelling vaginal 

lochia.








Physical exam:


Normocephalic, atraumatic. Heart RRR w/o M/G/R, lungs CTAB, abdomen soft and 

nontender with fundus firm at U-1, perineum intact, light lochia rubra, 

bilateral LE's no edema. Mood is good.





- ALLERGIES


Allergies/Adverse Reactions: 


                                    Allergies











Allergy/AdvReac Type Severity Reaction Status Date / Time


 


Penicillins Allergy Intermediate Edema Verified 22 18:31














- MEDICATIONS


Home Medications: 


                                Ambulatory Orders











 Medication  Instructions  Recorded  Confirmed


 


Loperamide HCl [Imodium A-D] 2 mg PO QID PRN #12 tablet 22 


 


Ondansetron Odt [Zofran] 4 mg TL Q6H PRN #15 tablet 22 


 


Albuterol Sulf [Ventolin Hfa 1 - 2 puffs INH Q4HR PRN #1 each 22 





Inhaler]   


 


Azithromycin See Taper PO DAILY #6 tablet 22 


 


predniSONE [Deltasone] 40 mg PO DAILY 4 Days #8 tablet 22 














- LABS


Result Diagrams: 


                                 22 07:45

## 2022-12-02 NOTE — DISCHARGE PLAN
Discharge Plan


Problem Reviewed?: Yes


Disposition: 01 Home, Self Care


Condition: Good


Diet: Regular


Activity Restrictions: No Restrictions


Shower Restrictions: No


Driving Restrictions: No


Weight Bearing: Full Weight


Instruction Topics:  Birth Vaginal After


No Smoking: If you smoke, Please STOP!  Call [phone] for help.


Follow-up with: 


Kanwal Shah CNM, ARNP [Provider Admit Priv/Credential] - 1 Week (Phone call 

with Kanwal Shah CNM/TC Friday, December 9th @ 1:15pm.)

## 2024-08-03 ENCOUNTER — HOSPITAL ENCOUNTER (EMERGENCY)
Dept: HOSPITAL 76 - ED | Age: 34
Discharge: HOME | End: 2024-08-03
Payer: SELF-PAY

## 2024-08-03 VITALS — DIASTOLIC BLOOD PRESSURE: 92 MMHG | OXYGEN SATURATION: 100 % | SYSTOLIC BLOOD PRESSURE: 136 MMHG

## 2024-08-03 DIAGNOSIS — Z79.899: ICD-10-CM

## 2024-08-03 DIAGNOSIS — N30.00: Primary | ICD-10-CM

## 2024-08-03 DIAGNOSIS — Z87.440: ICD-10-CM

## 2024-08-03 LAB
ALBUMIN DIAFP-MCNC: 4.3 G/DL (ref 3.2–5.5)
ALBUMIN/GLOB SERPL: 1.7 {RATIO} (ref 1–2.2)
ALP SERPL-CCNC: 82 IU/L (ref 42–121)
ALT SERPL W P-5'-P-CCNC: 58 IU/L (ref 10–60)
ANION GAP SERPL CALCULATED.4IONS-SCNC: 8 MMOL/L (ref 6–13)
AST SERPL W P-5'-P-CCNC: 104 IU/L (ref 10–42)
BASOPHILS NFR BLD AUTO: 0.1 10^3/UL (ref 0–0.1)
BASOPHILS NFR BLD AUTO: 1.1 %
BILIRUB BLD-MCNC: 1.3 MG/DL (ref 0.2–1)
BUN SERPL-MCNC: 7 MG/DL (ref 6–20)
C TRACH DNA SPEC NAA+PROBE-ACNC: NEGATIVE
CALCIUM UR-MCNC: 9.7 MG/DL (ref 8.5–10.3)
CHLORIDE SERPL-SCNC: 100 MMOL/L (ref 101–111)
CLARITY UR REFRACT.AUTO: CLEAR
CO2 SERPL-SCNC: 27 MMOL/L (ref 21–32)
CREAT SERPLBLD-SCNC: 0.7 MG/DL (ref 0.6–1.3)
EOSINOPHIL # BLD AUTO: 0 10^3/UL (ref 0–0.7)
EOSINOPHIL NFR BLD AUTO: 0.7 %
ERYTHROCYTE [DISTWIDTH] IN BLOOD BY AUTOMATED COUNT: 12.3 % (ref 12–15)
GFRSERPLBLD MDRD-ARVRAT: 97 ML/MIN/{1.73_M2} (ref 89–?)
GLOBULIN SER-MCNC: 2.6 G/DL (ref 2.1–4.2)
GLUCOSE SERPL-MCNC: 84 MG/DL (ref 74–104)
GLUCOSE UR QL STRIP.AUTO: NEGATIVE MG/DL
HCG UR QL: NEGATIVE
HCT VFR BLD AUTO: 42.5 % (ref 37–47)
HGB UR QL STRIP: 14.2 G/DL (ref 12–16)
KETONES UR QL STRIP.AUTO: 15 MG/DL
LIPASE SERPL-CCNC: 36 U/L (ref 11–82)
LYMPHOCYTES # SPEC AUTO: 0.9 10^3/UL (ref 1.5–3.5)
LYMPHOCYTES NFR BLD AUTO: 15.4 %
MCH RBC QN AUTO: 34.4 PG (ref 27–31)
MCHC RBC AUTO-ENTMCNC: 33.4 G/DL (ref 32–36)
MCV RBC AUTO: 102.9 FL (ref 81–99)
MONOCYTES # BLD AUTO: 0.8 10^3/UL (ref 0–1)
MONOCYTES NFR BLD AUTO: 14 %
N GONORRHOEA DNA GENITAL QL NAA+PROBE: NEGATIVE
NEUTROPHILS # BLD AUTO: 3.8 10^3/UL (ref 1.5–6.6)
NEUTROPHILS # SNV AUTO: 5.6 X10^3/UL (ref 4.8–10.8)
NEUTROPHILS NFR BLD AUTO: 68.6 %
NITRITE UR QL STRIP.AUTO: NEGATIVE
NRBC # BLD AUTO: 0 /100WBC
NRBC # BLD AUTO: 0 X10^3/UL
PDW BLD AUTO: 10.1 FL (ref 7.9–10.8)
PH UR STRIP.AUTO: 6 PH (ref 5–7.5)
PLATELET # BLD: 266 10^3/UL (ref 130–450)
POTASSIUM SERPL-SCNC: 3.6 MMOL/L (ref 3.5–4.5)
PROT SPEC-MCNC: 6.9 G/DL (ref 6.4–8.9)
PROT UR STRIP.AUTO-MCNC: NEGATIVE MG/DL
RBC # UR STRIP.AUTO: NEGATIVE /UL
RBC # URNS HPF: (no result) /HPF (ref 0–5)
RBC MAR: 4.13 10^6/UL (ref 4.2–5.4)
SODIUM SERPLBLD-SCNC: 135 MMOL/L (ref 135–145)
SP GR UR STRIP.AUTO: 1.01 (ref 1–1.03)
SQUAMOUS URNS QL MICRO: (no result)
T VAGINALIS RRNA GENITAL QL PROBE: NEGATIVE
UROBILINOGEN UR QL STRIP.AUTO: (no result) E.U./DL
UROBILINOGEN UR STRIP.AUTO-MCNC: (no result) MG/DL
WBC # UR MANUAL: (no result) /HPF (ref 0–5)

## 2024-08-03 PROCEDURE — 83690 ASSAY OF LIPASE: CPT

## 2024-08-03 PROCEDURE — 81003 URINALYSIS AUTO W/O SCOPE: CPT

## 2024-08-03 PROCEDURE — 36415 COLL VENOUS BLD VENIPUNCTURE: CPT

## 2024-08-03 PROCEDURE — 99284 EMERGENCY DEPT VISIT MOD MDM: CPT

## 2024-08-03 PROCEDURE — 80053 COMPREHEN METABOLIC PANEL: CPT

## 2024-08-03 PROCEDURE — 81025 URINE PREGNANCY TEST: CPT

## 2024-08-03 PROCEDURE — 87661 TRICHOMONAS VAGINALIS AMPLIF: CPT

## 2024-08-03 PROCEDURE — 81001 URINALYSIS AUTO W/SCOPE: CPT

## 2024-08-03 PROCEDURE — 85025 COMPLETE CBC W/AUTO DIFF WBC: CPT

## 2024-08-03 PROCEDURE — 87591 N.GONORRHOEAE DNA AMP PROB: CPT

## 2024-08-03 PROCEDURE — 87491 CHLMYD TRACH DNA AMP PROBE: CPT

## 2024-08-03 PROCEDURE — 87086 URINE CULTURE/COLONY COUNT: CPT

## 2024-08-03 PROCEDURE — 99283 EMERGENCY DEPT VISIT LOW MDM: CPT

## 2024-08-03 NOTE — ED PHYSICIAN DOCUMENTATION
PD HPI FEMALE 





- Stated complaint


Stated Complaint: FEMALE 





- Chief complaint


Chief Complaint: Abd Pain





- History obtained from


History obtained from: Patient





- History of Present Illness


Timing - onset: How many days ago (3)


Timing - duration: Days (3)


Timing - details: Gradual onset, Still present, Waxing and waning


Associated symptoms: Urinary frequency, Hematuria


Contributing factors: No: Pregnant


OB-GYN History: G (3), P (2)


Similar symptoms before: Diagnosis (UTI)


Recently seen: Clinic





- Additional information


Additional information: 





urinary tract symptoms seem to be present again after initial improvement with 

macrobid. Sensitivities are not available. 





Review of Systems


Constitutional: denies: Fever


Eyes: denies: Decreased vision


Ears: denies: Ear pain


Nose: denies: Rhinorrhea / runny nose, Congestion


Respiratory: denies: Cough


GI: denies: Abdominal Pain, Nausea, Vomiting, Diarrhea


: reports: Dysuria, Frequency, Hematuria





PD PAST MEDICAL HISTORY





- Past Medical History


Cardiovascular: None


Respiratory: Asthma, Pneumonia, Other


GYN: Other





- Past Surgical History


Past Surgical History: No





- Present Medications


Home Medications: 


                                Ambulatory Orders











 Medication  Instructions  Recorded  Confirmed


 


Loperamide HCl [Imodium A-D] 2 mg PO QID PRN #12 tablet 05/13/22 


 


Ondansetron Odt [Zofran] 4 mg TL Q6H PRN #15 tablet 05/13/22 


 


Albuterol Sulf [Ventolin Hfa 1 - 2 puffs INH Q4HR PRN #1 each 09/30/22 





Inhaler]   


 


Azithromycin See Taper PO DAILY #6 tablet 09/30/22 


 


predniSONE [Deltasone] 40 mg PO DAILY 4 Days #8 tablet 09/30/22 


 


Sulfamethox/Trimeth 800/160 1 tablet PO BID 5 Days #10 tablet 08/03/24 





[Bactrim Ds]   














- Allergies


Allergies/Adverse Reactions: 


                                    Allergies











Allergy/AdvReac Type Severity Reaction Status Date / Time


 


Penicillins Allergy Intermediate Edema Verified 08/03/24 13:02














- Social History


Does the pt smoke?: No


Smoking Status: Never smoker


Does the pt drink ETOH?: Yes


Does the pt have substance abuse?: No





- Immunizations


Immunizations are current?: No





- POLST


Patient has POLST: No





PD ED PE NORMAL





- Vitals


Vital signs reviewed: Yes (hypertensive )





- General


General: Alert and oriented X 3, No acute distress, Well developed/nourished





- HEENT


HEENT: Atraumatic, PERRL, EOMI





- Respiratory


Respiratory: No respiratory distress





- Back


Back: No CVA TTP, No spinal TTP, Other (mild tenderness to the lower lumbar 

paraspinous muscles. )





- Derm


Derm: Normal color, No rash





- Extremities


Extremities: No deformity, No edema





- Neuro


Neuro: Alert and oriented X 3, CNs 2-12 intact, No motor deficit, No sensory 

deficit, Normal speech


Eye Opening: Spontaneous


Motor: Obeys Commands


Verbal: Oriented


GCS Score: 15





- Psych


Psych: Normal mood, Normal affect





Results





- Vitals


Vitals: 


                               Vital Signs - 24 hr











  08/03/24





  12:13


 


Temperature 36.6 C


 


Heart Rate 85


 


Respiratory 18





Rate 


 


Blood Pressure 136/92 H


 


O2 Saturation 100








                                     Oxygen











O2 Source                      Room air

















- Labs


Labs: 


                                Laboratory Tests











  08/03/24 08/03/24 08/03/24





  12:30 12:41 12:41


 


WBC   5.6 


 


RBC   4.13 L 


 


Hgb   14.2 


 


Hct   42.5 


 


MCV   102.9 H 


 


MCH   34.4 H 


 


MCHC   33.4 


 


RDW   12.3 


 


Plt Count   266 


 


MPV   10.1 


 


Neut # (Auto)   3.8 


 


Lymph # (Auto)   0.9 L 


 


Mono # (Auto)   0.8 


 


Eos # (Auto)   0.0 


 


Baso # (Auto)   0.1 


 


Absolute Nucleated RBC   0.00 


 


Nucleated RBC %   0.0 


 


Sodium    135


 


Potassium    3.6


 


Chloride    100 L


 


Carbon Dioxide    27


 


Anion Gap    8.0


 


BUN    7


 


Creatinine    0.7


 


Estimated GFR (MDRD)    97


 


Glucose    84


 


Calcium    9.7


 


Total Bilirubin    1.3 H


 


AST    104 H


 


ALT    58


 


Alkaline Phosphatase    82


 


Total Protein    6.9


 


Albumin    4.3


 


Globulin    2.6


 


Albumin/Globulin Ratio    1.7


 


Lipase    36


 


Urine Color  DARK YELLOW  


 


Urine Clarity  CLEAR  


 


Urine pH  6.0  


 


Ur Specific Gravity  1.015  


 


Urine Protein  NEGATIVE  


 


Urine Glucose (UA)  NEGATIVE  


 


Urine Ketones  15 H  


 


Urine Occult Blood  NEGATIVE  


 


Urine Nitrite  NEGATIVE  


 


Urine Bilirubin  SMALL H  


 


Urine Urobilinogen  0.2 (NORMAL)  


 


Ur Leukocyte Esterase  TRACE H  


 


Urine RBC  0-5  


 


Urine WBC  0-3  


 


Ur Squamous Epith Cells  MOD Squamous H  


 


Urine Bacteria  Few  


 


Ur Microscopic Review  INDICATED  


 


Urine Culture Comments  NOT INDICATED  


 


Urine HCG, Qual  NEGATIVE  














PD Medical Decision Making





- ED course


Complexity details: reviewed results, re-evaluated patient, considered 

differential, d/w patient


Reviewed Lab Results: 





We reviewed a complete blood count showing a normal white blood cell count 

normal hemoglobin hematocrit and platelets chemistry showed normal electrolytes 

normal kidney and liver function with the exception of mildly elevated AST.  

Urinalysis shows ketones in the urine small bilirubin trace leukocyte Estrace 

and few bacteria.  The specimen did not make the grade 4 culture with moderate 

squamous cells.  I reviewed the patient's history and I have elected to treat 

recurrence of urinary tract infection.  She does have a history of alcohol 

consumption likely related to the mild elevation in the AST.  Her hemogram was 

unremarkable consistent with a benign process.  She did request testing for STD.


ED course: 





Omar Valentine is a 34-year-old female who had urinary tract infection 1 month 

ago she seemed to have improvement with the use of Macrobid and she has now had 

the symptoms back again for the past several days. We have processed the uterine

specimen that did not make the grade for culture we will empirically treat with 

Septra.  A specimen is pending for STD.





Departure





- Departure


Disposition: 01 Home, Self Care


Clinical Impression: 


Urinary tract infection


Qualifiers:


 Urinary tract infection type: acute cystitis Hematuria presence: without 

hematuria Qualified Code(s): N30.00 - Acute cystitis without hematuria





Condition: Stable


Instructions:  ED UTI Cystitis Female


Follow-Up: 


GILBERT HAMPTON MD [Physician No Access] - 


Prescriptions: 


Sulfamethox/Trimeth 800/160 [Bactrim Ds] 1 tablet PO BID 5 Days #10 tablet


Comments: 


Moris, today it looks like you still have some evidence of urinary tract 

infection and we would like to change her antibiotic to sulfamethoxazole 

trimethoprim.  This is concentrated in the urine and usually does a good job of 

treating urinary tract infection.  The urine specimen provided today did not 

make the grade for culture.  (too many skin cells) A test for chlamydia and GC 

is pending.The antibiotic has been E scribed to the [facility] in [place].


Forms:  PCP List


Discharge Date/Time: 08/03/24 13:22